# Patient Record
Sex: FEMALE | Race: WHITE | Employment: UNEMPLOYED | URBAN - NONMETROPOLITAN AREA
[De-identification: names, ages, dates, MRNs, and addresses within clinical notes are randomized per-mention and may not be internally consistent; named-entity substitution may affect disease eponyms.]

---

## 2017-12-05 ENCOUNTER — OFFICE VISIT (OUTPATIENT)
Dept: OPTOMETRY | Age: 62
End: 2017-12-05
Payer: COMMERCIAL

## 2017-12-05 DIAGNOSIS — H52.4 HYPEROPIA OF BOTH EYES WITH ASTIGMATISM AND PRESBYOPIA: Primary | ICD-10-CM

## 2017-12-05 DIAGNOSIS — H52.203 HYPEROPIA OF BOTH EYES WITH ASTIGMATISM AND PRESBYOPIA: Primary | ICD-10-CM

## 2017-12-05 DIAGNOSIS — H52.03 HYPEROPIA OF BOTH EYES WITH ASTIGMATISM AND PRESBYOPIA: Primary | ICD-10-CM

## 2017-12-05 PROCEDURE — 92014 COMPRE OPH EXAM EST PT 1/>: CPT | Performed by: OPTOMETRIST

## 2017-12-05 RX ORDER — BENOXINATE HCL/FLUORESCEIN SOD 0.4%-0.25%
1 DROPS OPHTHALMIC (EYE) ONCE
Status: COMPLETED | OUTPATIENT
Start: 2017-12-05 | End: 2017-12-05

## 2017-12-05 RX ADMIN — Medication 1 DROP: at 13:33

## 2017-12-05 ASSESSMENT — REFRACTION_MANIFEST
OS_ADD: +2.50
OD_SPHERE: +2.00
OS_SPHERE: +1.75
OD_AXIS: 098
OS_CYLINDER: -1.50
OS_AXIS: 078
OD_ADD: +2.50
OD_CYLINDER: -1.75

## 2017-12-05 ASSESSMENT — KERATOMETRY
OS_K2POWER_DIOPTERS: 46.00
OS_AXISANGLE2_DEGREES: 067
OS_AXISANGLE_DEGREES: 157
OD_K1POWER_DIOPTERS: 45.50
OD_K2POWER_DIOPTERS: 46.25
OS_K1POWER_DIOPTERS: 45.25
OD_AXISANGLE2_DEGREES: 104
OD_AXISANGLE_DEGREES: 014

## 2017-12-05 ASSESSMENT — REFRACTION_WEARINGRX
OD_SPHERE: +1.25
OS_SPHERE: +1.00
OS_ADD: +2.50
SPECS_TYPE: PAL
OS_CYLINDER: -1.25
OS_AXIS: 078
OD_ADD: +2.50
OD_CYLINDER: -1.75
OD_AXIS: 100

## 2017-12-05 ASSESSMENT — VISUAL ACUITY
OD_CC: 20/30 OU
OS_CC+: -1
CORRECTION_TYPE: GLASSES
OD_CC+: -1
METHOD: SNELLEN - LINEAR
OS_CC: 20/30

## 2017-12-05 ASSESSMENT — TONOMETRY
OS_IOP_MMHG: 18
IOP_METHOD: APPLANATION W FLURESS DROP
OD_IOP_MMHG: 18

## 2017-12-05 ASSESSMENT — SLIT LAMP EXAM - LIDS
COMMENTS: NORMAL
COMMENTS: NORMAL

## 2017-12-05 NOTE — PROGRESS NOTES
Miriam Riggs presents today for   Chief Complaint   Patient presents with    Blurred Vision    Vision Exam   .    HPI     Blurred Vision   In both eyes. Vision is blurred. Context:  distance vision and reading. Comments   Last Vision Exam: 6/23/2015 Aw  Last Ophthalmology Exam: n/a  Last Filled Glasses Rx: 6/23/2015  Insurance: Ret[employer]  Update: Glasses  Feels like her vision is getting worse both distance and for reading. Full time glasses  Tipping the glasses up to try to read                      Main Ophthalmology Exam     External Exam       Right Left    External Normal Normal          Slit Lamp Exam       Right Left    Lids/Lashes Normal Normal    Conjunctiva/Sclera White and quiet White and quiet    Cornea Clear Clear    Anterior Chamber Deep and quiet Deep and quiet    Iris Round and reactive Round and reactive    Lens Trace Nuclear sclerosis Clear    Vitreous Normal Normal                   Tonometry     Tonometry (Applanation w Fluress drop, 1:49 PM)       Right Left    Pressure 18 18               Visual Acuity (Snellen - Linear)       Right Left    Dist cc 20/40 -1 20/30 -1    Near cc 20/30 OU     Correction:  Glasses        Keratometry     Keratometry       K1 Axis K2 Axis    Right 45.50 104 46.25 014    Left 45.25 067 46.00 157                Ophthalmology Exam     Wearing Rx       Sphere Cylinder Axis Add    Right +1.25 -1.75 100 +2.50    Left +1.00 -1.25 078 +2.50    Age:  1yrs    Type:  PAL                Manifest Refraction     Manifest Refraction       Sphere Cylinder Axis Dist VA Add    Right +2.00 -1.75 098 20/20- +2.50    Left +1.75 -1.50 078 20/20- +2.50          Manifest Refraction #2 (Auto)       Sphere Cylinder Axis Dist VA Add    Right +2.25 -1.75 099      Left +2.25 -1.75 079                 Final Rx       Sphere Cylinder Axis Add    Right +2.00 -1.75 098 +2.50    Left +1.75 -1.50 078 +2.50    Type:  PAL    Expiration Date:  12/6/2019            1.  Hyperopia of both eyes with astigmatism and presbyopia           Patient Instructions   New glasses recommended      Return in about 2 years (around 12/5/2019) for complete eye exam.

## 2021-06-15 ENCOUNTER — OFFICE VISIT (OUTPATIENT)
Dept: OPTOMETRY | Age: 66
End: 2021-06-15
Payer: COMMERCIAL

## 2021-06-15 DIAGNOSIS — H52.03 HYPEROPIA OF BOTH EYES WITH ASTIGMATISM AND PRESBYOPIA: Primary | ICD-10-CM

## 2021-06-15 DIAGNOSIS — H53.8 BLURRED VISION, BILATERAL: ICD-10-CM

## 2021-06-15 DIAGNOSIS — H25.13 NUCLEAR SCLEROSIS OF BOTH EYES: ICD-10-CM

## 2021-06-15 DIAGNOSIS — H52.203 HYPEROPIA OF BOTH EYES WITH ASTIGMATISM AND PRESBYOPIA: Primary | ICD-10-CM

## 2021-06-15 DIAGNOSIS — H52.4 HYPEROPIA OF BOTH EYES WITH ASTIGMATISM AND PRESBYOPIA: Primary | ICD-10-CM

## 2021-06-15 PROCEDURE — 92004 COMPRE OPH EXAM NEW PT 1/>: CPT | Performed by: OPTOMETRIST

## 2021-06-15 ASSESSMENT — ENCOUNTER SYMPTOMS
RESPIRATORY NEGATIVE: 0
GASTROINTESTINAL NEGATIVE: 0
ALLERGIC/IMMUNOLOGIC NEGATIVE: 0
EYES NEGATIVE: 0

## 2021-06-15 ASSESSMENT — REFRACTION_MANIFEST
OS_AXIS: 076
OS_SPHERE: +1.50
OD_AXIS: 098
OS_ADD: +2.75
OD_ADD: +2.75
OS_CYLINDER: -1.00
OD_CYLINDER: -1.50
OD_SPHERE: +1.75

## 2021-06-15 ASSESSMENT — REFRACTION_WEARINGRX
OS_SPHERE: +1.75
OD_AXIS: 098
OS_ADD: +2.50
SPECS_TYPE: PAL
OS_AXIS: 078
OD_CYLINDER: -1.75
OS_CYLINDER: -1.50
OD_ADD: +2.50
OD_SPHERE: +2.00

## 2021-06-15 ASSESSMENT — TONOMETRY
IOP_METHOD: NON-CONTACT AIR PUFF
OD_IOP_MMHG: 19
OS_IOP_MMHG: 18

## 2021-06-15 ASSESSMENT — SLIT LAMP EXAM - LIDS
COMMENTS: NORMAL
COMMENTS: NORMAL

## 2021-06-15 ASSESSMENT — VISUAL ACUITY
METHOD: SNELLEN - LINEAR
CORRECTION_TYPE: GLASSES
OS_CC: 20/30
OD_CC: 20/30 OU
OD_CC+: -1

## 2021-06-15 ASSESSMENT — KERATOMETRY
OS_AXISANGLE_DEGREES: 096
METHOD_AUTO_MANUAL: AUTOMATED
OS_K2POWER_DIOPTERS: 46.75
OS_AXISANGLE2_DEGREES: 006
OD_K1POWER_DIOPTERS: 45.75
OD_AXISANGLE2_DEGREES: 099
OS_K1POWER_DIOPTERS: 46.00
OD_AXISANGLE_DEGREES: 099
OD_K2POWER_DIOPTERS: 46.50

## 2021-06-15 NOTE — PROGRESS NOTES
Devyn Walker presents today for   Chief Complaint   Patient presents with    Vision Exam   .    HPI     Last Vision Exam: 12/05/17  Last Ophthalmology Exam: n/a    Last Filled Glasses Rx: 12/05/17  Insurance: [employer] Isbell   Update: update glasses   Distance and reading are getting more blurry  Full time   Both a little blurry in the distance and the near          No current outpatient medications on file. No current facility-administered medications for this visit. Family History   Problem Relation Age of Onset   Laure Alas Sudden Death Father         suicide    Diabetes Other         aunts    Asthma Daughter     Cataracts Neg Hx     Glaucoma Neg Hx      Social History     Socioeconomic History    Marital status:      Spouse name: None    Number of children: None    Years of education: None    Highest education level: None   Occupational History    None   Tobacco Use    Smoking status: Current Every Day Smoker    Smokeless tobacco: Never Used   Substance and Sexual Activity    Alcohol use: None    Drug use: None    Sexual activity: None   Other Topics Concern    None   Social History Narrative    None     Social Determinants of Health     Financial Resource Strain:     Difficulty of Paying Living Expenses:    Food Insecurity:     Worried About Running Out of Food in the Last Year:     Ran Out of Food in the Last Year:    Transportation Needs:     Lack of Transportation (Medical):      Lack of Transportation (Non-Medical):    Physical Activity:     Days of Exercise per Week:     Minutes of Exercise per Session:    Stress:     Feeling of Stress :    Social Connections:     Frequency of Communication with Friends and Family:     Frequency of Social Gatherings with Friends and Family:     Attends Yarsani Services:     Active Member of Clubs or Organizations:     Attends Club or Organization Meetings:     Marital Status:    Intimate Partner Violence:     Fear of Current or Ex-Partner:     Emotionally Abused:     Physically Abused:     Sexually Abused:      Past Medical History:   Diagnosis Date    Hyperopia with astigmatism and presbyopia        ROS     Negative for: Constitutional, Gastrointestinal, Neurological, Skin, Genitourinary, Musculoskeletal, HENT, Endocrine, Cardiovascular, Eyes, Respiratory, Psychiatric, Allergic/Imm, Heme/Lymph          Main Ophthalmology Exam     External Exam       Right Left    External Normal Normal          Slit Lamp Exam       Right Left    Lids/Lashes Normal Normal    Conjunctiva/Sclera White and quiet White and quiet    Cornea Clear Clear    Anterior Chamber Deep and quiet Deep and quiet    Iris Round and reactive Round and reactive    Lens 1+ Nuclear sclerosis, 1+ Posterior subcapsular cataract 1+ Nuclear sclerosis    Vitreous Normal Normal          Fundus Exam       Right Left    Disc Normal Normal    C/D Ratio 0.2 0.2    Macula Normal Normal    Vessels Normal Normal                   Tonometry     Tonometry (Non-contact air puff, 2:04 PM)       Right Left    Pressure 19 18   IOP.9             19.6  CH:  10.4          11.9  WS: 7.4          8.0                  Not recorded         Not recorded          Visual Acuity (Snellen - Linear)       Right Left    Dist cc 20/30 -1 20/30    Near cc 20/30 OU     Correction: Glasses          Pupils     Pupils       Pupils    Right PERRL    Left PERRL               Not recorded        Keratometry     Keratometry (Automated)       K1 Axis K2 Axis    Right 45.75 099 46.50 099    Left 46.00 006 46.75 096                  Ophthalmology Exam     Wearing Rx       Sphere Cylinder Axis Add    Right +2.00 -1.75 098 +2.50    Left +1.75 -1.50 078 +2.50    Age: 3yrs    Type: PAL              Manifest Refraction     Manifest Refraction       Sphere Cylinder Axis Dist VA Add    Right +1.75 -1.50 098 20/25 +2.75    Left +1.50 -1.00 076 20/25- +2.75          Manifest Refraction #2 (Retinoscopy)       Sphere Cylinder [place] Dist VA Add    Right +2.25 -1.50 093      Left +1.75 -1.00 078                 Final Rx       Sphere Cylinder Axis Add    Right +1.75 -1.50 098 +2.75    Left +1.50 -1.00 076 +2.75    Type: PAL    Expiration Date: 6/16/2023            No orders of the defined types were placed in this encounter. IMPRESSION:  1. Hyperopia of both eyes with astigmatism and presbyopia    2. Nuclear sclerosis of both eyes    3. Blurred vision, bilateral        PLAN:    1. New glasses recommended  2. Monitor for future progression and visual significance. Counseled patient that more glare may be noticed and more light may be needed for reading.   3. \"      Patient Instructions   New glasses recommended      Return in about 2 years (around 6/15/2023) for complete eye exam.

## 2022-07-20 ENCOUNTER — TELEPHONE (OUTPATIENT)
Dept: SURGERY | Age: 67
End: 2022-07-20

## 2022-07-20 NOTE — TELEPHONE ENCOUNTER
H &P Colonoscopy  Patient:Regine Mckeon                 :1955  (no) patient has seen Dr. Shmuel Guo prior to procedure  PCP: Dr. Monse Sanford        HPI:        Colonoscopy  Abd pain: no  Anemia: no  Bloating:no  Diarrhea: yes, occasionally  Constipation: no  Melena: no  Hematochezia:no  Rectal Bleeding:yes, due to external hemorrhoids  Rectal/Anal Pain:no  Pruritus: yes, due to hemorrhoids  Family history colon Cancer: no  Previous colon cancer: no  Previous Colon Polyp: no  Change in bowels: no  Decrease caliber of stool: no  Change in color of stool: no    Previous work up date: No previous Colonoscopy       Family History   Problem Relation Age of Onset    Sudden Death Father         suicide    Diabetes Other         aunts    Asthma Daughter     Cataracts Neg Hx     Glaucoma Neg Hx      Social History     Socioeconomic History    Marital status:      Spouse name: Not on file    Number of children: Not on file    Years of education: Not on file    Highest education level: Not on file   Occupational History    Not on file   Tobacco Use    Smoking status: Every Day    Smokeless tobacco: Never   Substance and Sexual Activity    Alcohol use: Not on file    Drug use: Not on file    Sexual activity: Not on file   Other Topics Concern    Not on file   Social History Narrative    Not on file     Social Determinants of Health     Financial Resource Strain: Not on file   Food Insecurity: Not on file   Transportation Needs: Not on file   Physical Activity: Not on file   Stress: Not on file   Social Connections: Not on file   Intimate Partner Violence: Not on file   Housing Stability: Not on file     Past Surgical History:   Procedure Laterality Date     SECTION      OTHER SURGICAL HISTORY  10/24/91    low transverse laparotrachelotomy, bilateral partial salpingectomy, modified [place]     TONSILLECTOMY AND ADENOIDECTOMY      TUBAL LIGATION      WISDOM TOOTH EXTRACTION       Past Medical History: Diagnosis Date    Hyperopia with astigmatism and presbyopia      No current outpatient medications on file prior to visit. No current facility-administered medications on file prior to visit.      Allergies as of 07/20/2022    (No Known Allergies)           PEx:                ASSESSMENT:        PLAN: Colonoscopy

## 2022-07-20 NOTE — TELEPHONE ENCOUNTER
Instructions given and review with the patient. All questions answered and patient denies any further questions at this time. Patient scheduled for CS on 8/12/2022 at [place] [address] with Dr. Mya Hamlin. Offered [place] due to patient address in [place] but patient is only wanting [place] [address] due to PCP and since her  had his CS in [place] [address] they they had such a good experience. She did not want to schedule in [place]. Instructed patient she can purchase the Miralax/Gatorade bowel prep over the counter at her pharmacy.

## 2022-08-26 ENCOUNTER — TELEPHONE (OUTPATIENT)
Dept: SURGERY | Age: 67
End: 2022-08-26

## 2022-08-26 NOTE — LETTER
[facility] [place] [address] [facility]  [address] [place] [address]  Phone: [phone]  Fax: [phone]    Amisha Ndiaye MD      8/26/2022    Dear Hina Hancock,      Dr. Ami Jett reviewed the results of your recent colonoscopy. The lower scope showed mild sigmoid diverticulosis . Dr. Ami Jett would like you to increase your fiber and water for the hemorrhoids. You may also see an increase bleeding from the rectum for the next 2-3 weeks, then should improve. If the bleeding is persists follow up with Dr. Ami Jett for further options. His recommendations are to be scoped again in colonoscopy years, due to 10 years. If you have any questions or concerns regarding your test results or our recommendations, please call the office at [phone].       Sincerely,           Camelia Arredondo LPN

## 2022-08-26 NOTE — TELEPHONE ENCOUNTER
Letter created and mailed to patient with results from recent colonoscopy at [facility] with Dr. Jorge Lundy on 8-12-22. Updated history, health maintenance, and recall. Forwarded results to PCP.

## 2025-04-04 ENCOUNTER — OFFICE VISIT (OUTPATIENT)
Dept: SURGERY | Age: 70
End: 2025-04-04
Payer: MEDICARE

## 2025-04-04 VITALS
TEMPERATURE: 98.1 F | SYSTOLIC BLOOD PRESSURE: 130 MMHG | OXYGEN SATURATION: 97 % | HEIGHT: 62 IN | BODY MASS INDEX: 33.31 KG/M2 | WEIGHT: 181 LBS | DIASTOLIC BLOOD PRESSURE: 80 MMHG | HEART RATE: 95 BPM

## 2025-04-04 DIAGNOSIS — K64.2 GRADE III HEMORRHOIDS: Primary | ICD-10-CM

## 2025-04-04 PROCEDURE — 1090F PRES/ABSN URINE INCON ASSESS: CPT | Performed by: SURGERY

## 2025-04-04 PROCEDURE — G8400 PT W/DXA NO RESULTS DOC: HCPCS | Performed by: SURGERY

## 2025-04-04 PROCEDURE — 1159F MED LIST DOCD IN RCRD: CPT | Performed by: SURGERY

## 2025-04-04 PROCEDURE — 1123F ACP DISCUSS/DSCN MKR DOCD: CPT | Performed by: SURGERY

## 2025-04-04 PROCEDURE — 99203 OFFICE O/P NEW LOW 30 MIN: CPT | Performed by: SURGERY

## 2025-04-04 PROCEDURE — 1036F TOBACCO NON-USER: CPT | Performed by: SURGERY

## 2025-04-04 PROCEDURE — G8427 DOCREV CUR MEDS BY ELIG CLIN: HCPCS | Performed by: SURGERY

## 2025-04-04 PROCEDURE — 3017F COLORECTAL CA SCREEN DOC REV: CPT | Performed by: SURGERY

## 2025-04-04 PROCEDURE — G8417 CALC BMI ABV UP PARAM F/U: HCPCS | Performed by: SURGERY

## 2025-04-04 RX ORDER — THYROID 120 MG/1
120 TABLET ORAL
COMMUNITY

## 2025-04-04 RX ORDER — PHENTERMINE HYDROCHLORIDE 15 MG/1
15 CAPSULE ORAL DAILY
COMMUNITY
Start: 2025-03-18 | End: 2025-05-17

## 2025-04-04 NOTE — PATIENT INSTRUCTIONS
Fleet enema the night prior to procedure and the morning of the procedure    Follow pre op instructions for procedure.     Hold the Diet medication the day of procedure

## 2025-04-04 NOTE — PROGRESS NOTES
Regine Ugalde is a 69 y.o. female      CC:    Hemorrhoids with itching    HISTORY OF PRESENT ILLNESS:    Pt is 69-year-old female who states the she has had some hemorrhoidal issues for the last 2 months.  She has a little amount that sticks out at times but mainly her main complaint is itching.  She has had 1 time of spotting bright red blood.  She had a colonoscopy in 2022 which showed internal hemorrhoids which were banded.    Patient is here for her hemorrhoids.  It causes a lot of itching of the rectum.  She states that had pain once a few weeks ago but nothing since.  No rectal bleeding. Deinies any constipation or diarrhea. She does was some cream for the itching. For awhile was using Preparation H and now using something from [employer].      Last Colonoscopy was 8- by Dr Prater with one internal hemorrhoid banding with findings of mild sigmoid diverticulosis and grade 3 internal hemorrhoids.    Review of Systems:    General:  Fever: Negative  Weight Change:Negative  Night Sweats: Negative    Eye:  Blurry Vision:Negative  Double Vision: Negative    Ent:  Headaches: Negative  Sore throat: Negative    Allergy/Immunology:  Hives: Negative  Latex allergy: Negative    Hematology/Lymphatic:  Bleeding Problems: Negative  Blood Clots: Negative  Swollen Lymph Nodes: Negative    Lungs:  Cough: Negative  SOB: Negative  Wheezing:Negative    Cardiovascular:  Chest Pain: Negative  Palpitations:Negative    GI:   Decreased Appetite: Negative  Heartburn: Negative  Dysphagia: Negative  Nausea/Vomiting: Negative  Abdominal Pain: Negative  Change in Bowels:Negative  Constipation: Negative  Diarrhea: Negative  Rectal Bleeding: Negative    :   Dysuria: Negative  Increase Urinary Frequency/Urgency: Negative    Neuro:  Seizures: Negative  Confusion: Negative        PAST MEDICAL HISTORY:      Family History   Problem Relation Age of Onset    Sudden Death Father         suicide    Diabetes Other         aunts    Asthma Daughter

## 2025-04-04 NOTE — PROGRESS NOTES
Patient is here for her hemorrhoids.  It causes a lot of itching of the rectum.  She states that had pain once a few weeks ago but nothing since.  No rectal bleeding. Deinies any constipation or diarrhea. She does was some cream for the itching. For awhile was using Preparation H and now using something from [employer].     Last Colonoscopy was 8- by Dr Prater with one internal hemorrhoid banding with findings of mild sigmoid diverticulosis and grade 3 internal hemorrhoids.

## 2025-05-12 DIAGNOSIS — K62.3 RECTAL PROLAPSE: Primary | ICD-10-CM
